# Patient Record
(demographics unavailable — no encounter records)

---

## 2021-05-10 NOTE — EMERGENCY DEPARTMENT REPORT
ED General Adult HPI





- General


Chief complaint: Pain General


Stated complaint: LUPUS FLAIR/SOB


Time Seen by Provider: 05/10/21 21:06


Source: patient


Mode of arrival: Ambulatory


Limitations: No Limitations





- History of Present Illness


Initial comments: 





Patient is 50 years old female with history of lupus.  Patient sent to the 

emergency room by her primary care physician Dr. Mccray.  Patient presented with

what she called a typical lupus flareup.  Patient is complaining of chest pain, 

back pain, lower extremity pain for the last 3 to 4 days.  Patient denied any 

fever or chills.  No cough or shortness of breath.  Patient also denied any 

nausea or vomiting.





- Related Data


                                Home Medications











 Medication  Instructions  Recorded  Confirmed  Last Taken


 


Gabapentin 600 mg PO BID 06/07/15 03/04/16 06/06/15


 


ALPRAZolam [Xanax TAB] 2 mg PO PRN PRN 03/04/16 03/04/16 Unknown


 


Sertraline [Zoloft] 50 mg PO DAILY 03/04/16 03/04/16 Unknown








                                  Previous Rx's











 Medication  Instructions  Recorded  Last Taken  Type


 


Hydrocodone Bit/Acetaminophen 1 each PO Q6H PRN #60 tablet 09/19/13 06/06/15 Rx





[Lortab  mg]    


 


Calcium Carbonate/Vitamin D3 1 each PO DAILY #30 tablet 03/19/14 06/06/15 Rx





[Calcium 600-Vit D3 400 Tablet]    


 


Tizanidine HCl [Zanaflex] 4 mg PO TID PRN #90 tablet 03/19/14 06/06/15 Rx


 


Zolpidem (Nf) [Ambien (Nf)] 10 mg PO QHS PRN #30 tablet 03/19/14 06/06/15 Rx


 


dilTIAZem [Cardizem] 60 mg PO QDAY #30 tablet 03/19/14 06/06/15 Rx


 


Aspirin [Aspirin BABY CHEW TAB] 81 mg PO QDAY #30 tab 06/10/15 Unknown Rx


 


AtorvaSTATin [Lipitor] 20 mg PO QHS #30 tablet 06/10/15 Unknown Rx


 


HYDROcodone/APAP  [[place] 1 each PO Q6H PRN #30 tablet 06/10/15 Unknown Rx





 mg TAB]    


 


Promethazine [Phenergan] 25 mg PO Q6H PRN #30 tablet 06/10/15 Unknown Rx


 


predniSONE [Deltasone] 20 mg PO QDAY #5 tab 03/04/16 Unknown Rx


 


HYDROcodone/APAP 5-325 [[place] 1 each PO Q6HR PRN #18 tablet 10/04/17 Unknown Rx





5/325]    


 


Naproxen [Naprosyn TAB] 375 mg PO BID PRN #20 tablet 10/04/17 Unknown Rx


 


Ondansetron [Zofran Odt] 4 mg PO Q8H PRN #15 tab.rapdis 10/04/17 Unknown Rx


 


Tamsulosin [Flomax] 0.4 mg PO QDAY #10 cap 10/04/17 Unknown Rx











                                    Allergies











Allergy/AdvReac Type Severity Reaction Status Date / Time


 


povidone-iodine Allergy  Hives Verified 09/16/13 13:33





[From Betadine]     


 


soap [From Betadine] Allergy  Hives Verified 09/16/13 13:33


 


tomato [Tomato] Allergy  Angioedema Verified 09/16/13 13:37


 


latex AdvReac  Hives Verified 06/07/15 00:02














ED Review of Systems


ROS: 


Stated complaint: LUPUS FLAIR/SOB


Other details as noted in HPI





Comment: All other systems reviewed and negative


Constitutional: denies: chills, fever


Respiratory: denies: cough, shortness of breath, SOB with exertion, SOB at rest


Cardiovascular: chest pain, palpitations


Gastrointestinal: denies: abdominal pain, nausea, vomiting, diarrhea


Musculoskeletal: back pain, arthralgia, myalgia


Neurological: denies: headache





ED Past Medical Hx





- Past Medical History


Previous Medical History?: Yes


Hx Hypertension: Yes


Hx CVA: Yes (R.Side weakness)


Hx Heart Attack/AMI: Yes


Hx Congestive Heart Failure: No


Hx Diabetes: No


Hx Deep Vein Thrombosis: Yes


Hx Pulmonary Embolism: Yes


Hx GERD: No


Hx Liver Disease: No


Hx Renal Disease: Yes (Lupus Related)


Hx Sickle Cell Disease: No


Hx Arthritis: Yes


Hx Headaches / Migraines: No


Hx Seizures: No


Hx Kidney Stones: Yes


Hx Asthma: No


Hx COPD: No


Hx Tuberculosis: No


Hx Dementia: No


Hx HIV: No


Additional medical history: Lupus, Right Hemiparesis





- Surgical History


Past Surgical History?: Yes


Hx Coronary Stent: No


Hx Pacemaker: No


Hx Internal Defibrillator: No


Hx Cholecystectomy: Yes


Hx Appendectomy: Yes


Additional Surgical History: Tonisillectomy, Endometriosis.





- Social History


Smoking Status: Never Smoker


Substance Use Type: None





- Medications


Home Medications: 


                                Home Medications











 Medication  Instructions  Recorded  Confirmed  Last Taken  Type


 


Hydrocodone Bit/Acetaminophen 1 each PO Q6H PRN #60 tablet 09/19/13 03/04/16 

06/06/15 Rx





[Lortab  mg]     


 


Calcium Carbonate/Vitamin D3 1 each PO DAILY #30 tablet 03/19/14 03/04/16 

06/06/15 Rx





[Calcium 600-Vit D3 400 Tablet]     


 


Tizanidine HCl [Zanaflex] 4 mg PO TID PRN #90 tablet 03/19/14 03/04/16 06/06/15 

Rx


 


Zolpidem (Nf) [Ambien (Nf)] 10 mg PO QHS PRN #30 tablet 03/19/14 03/04/16 

06/06/15 Rx


 


dilTIAZem [Cardizem] 60 mg PO QDAY #30 tablet 03/19/14 03/04/16 06/06/15 Rx


 


Gabapentin 600 mg PO BID 06/07/15 03/04/16 06/06/15 History


 


Aspirin [Aspirin BABY CHEW TAB] 81 mg PO QDAY #30 tab 06/10/15 03/04/16 Unknown 

Rx


 


AtorvaSTATin [Lipitor] 20 mg PO QHS #30 tablet 06/10/15 03/04/16 Unknown Rx


 


HYDROcodone/APAP  [[place] 1 each PO Q6H PRN #30 tablet 06/10/15 03/04/16 

Unknown Rx





 mg TAB]     


 


Promethazine [Phenergan] 25 mg PO Q6H PRN #30 tablet 06/10/15 03/04/16 Unknown 

Rx


 


ALPRAZolam [Xanax TAB] 2 mg PO PRN PRN 03/04/16 03/04/16 Unknown History


 


Sertraline [Zoloft] 50 mg PO DAILY 03/04/16 03/04/16 Unknown History


 


predniSONE [Deltasone] 20 mg PO QDAY #5 tab 03/04/16  Unknown Rx


 


HYDROcodone/APAP 5-325 [[place] 1 each PO Q6HR PRN #18 tablet 10/04/17  Unknown Rx





5/325]     


 


Naproxen [Naprosyn TAB] 375 mg PO BID PRN #20 tablet 10/04/17  Unknown Rx


 


Ondansetron [Zofran Odt] 4 mg PO Q8H PRN #15 tab.rapdis 10/04/17  Unknown Rx


 


Tamsulosin [Flomax] 0.4 mg PO QDAY #10 cap 10/04/17  Unknown Rx














ED Physical Exam





- General


Limitations: No Limitations


General appearance: alert, in no apparent distress





- Head


Head exam: Present: atraumatic, normocephalic, normal inspection





- Eye


Eye exam: Present: normal appearance, PERRL





- ENT


ENT exam: Present: mucous membranes dry





- Neck


Neck exam: Present: normal inspection, full ROM.  Absent: tenderness, 

meningismus





- Respiratory


Respiratory exam: Present: normal lung sounds bilaterally





- Cardiovascular


Cardiovascular Exam: Present: tachycardia





- GI/Abdominal


GI/Abdominal exam: Present: soft, normal bowel sounds.  Absent: distended, 

tenderness, guarding, rebound, rigid, organomegaly, mass, bruit, pulsatile mass,

 hernia





- Extremities Exam


Extremities exam: Present: normal inspection, full ROM, normal capillary refill





- Back Exam


Back exam: Present: normal inspection, full ROM.  Absent: CVA tenderness (R), 

CVA tenderness (L)





- Neurological Exam


Neurological exam: Present: alert, oriented X3, CN II-XII intact





- Psychiatric


Psychiatric exam: Present: normal mood





- Skin


Skin exam: Present: warm, dry, intact





ED Course


                                   Vital Signs











  05/10/21





  15:54


 


Temperature 98.3 F


 


Pulse Rate 113 H


 


Respiratory 18





Rate 


 


Blood Pressure 171/91





[Right] 


 


O2 Sat by Pulse 100





Oximetry 














ED Medical Decision Making





- Lab Data


Result diagrams: 


                                 05/10/21 16:51





                                 05/10/21 16:51





- Radiology Data


Radiology results: report reviewed





- Medical Decision Making





Patient is 50 years old female with history of lupus.  Patient sent to the 

emergency room by her primary care physician Dr. Mccray.  Patient presented with

 what she called a typical lupus flareup.  Patient is complaining of chest pain,

 back pain, lower extremity pain for the last 3 to 4 days.  Patient denied any 

fever or chills.  No cough or shortness of breath.  Patient also denied any 

nausea or vomiting.


Labs reviewed and is unremarkable.  Chest x-ray is negative for acute finding.  

Patient received normal saline, morphine and Zofran and Solu-Medrol.  Discussed 

patient with , he agreed to admit the patient to medical service for 

further management.


Critical care attestation.: 


If time is entered above; I have spent that time in minutes in the direct care 

of this critically ill patient, excluding procedure time.








ED Disposition


Clinical Impression: 


 Exacerbation of systemic lupus erythematosus, Acute pain in joint





Disposition: DC-09 OP ADMIT IP TO THIS HOSP


Is pt being admited?: Yes


Condition: Stable


Referrals: 


ALENA MCCRAY MD [Primary Care Provider] - 3-5 Days

## 2021-05-10 NOTE — HISTORY AND PHYSICAL REPORT
History of Present Illness


Date of examination: 05/10/21


Date of admission: 


05/10/21


Chief complaint: 





Joint pain


Skin rash


Generalized body ache


History of present illness: 





Patient is 50 years old female with history of lupus.  Patient sent to the 

emergency room by her primary care physician Dr. Bazan.  Patient presented with

what she called a typical lupus flareup.  Patient is complaining of chest pain, 

back pain, lower extremity pain for the last 3 to 4 days.  Patient denied any 

fever or chills.  No cough or shortness of breath.  Patient also denied any naus

ea or vomiting.





ED work-up shows WBC 7.8 hemoglobin 14.4, platelets 284, sodium 141, potassium 

4.1, creatinine 0.6


Magnesium 1.80 serum glucose 96, troponin normal.  Checks x-ray no acute 

finding.


Patient seen in ED at bedside.  Alert oriented x3.  Patient reported generalized

pain and joint pain.  Patient has history of lupus.She said she has similar 

some symptom whenever she has lupus flareup.  Patient has reddened rash left 

thigh.  She said is been there for few days and she tried over-the-counter 

topical cream without help.  I reviewed patient medical record, medication 

record, and vital signs.  Will start patient on steroid systemic, pain 

management and IV hydration.





Past History


Past Medical History: hypertension


Past Surgical History: No surgical history


Social history: lives with family


Family history: no significant family history





Medications and Allergies


                                    Allergies











Allergy/AdvReac Type Severity Reaction Status Date / Time


 


povidone-iodine Allergy  Hives Verified 09/16/13 13:33





[From Betadine]     


 


soap [From Betadine] Allergy  Hives Verified 09/16/13 13:33


 


tomato [Tomato] Allergy  Angioedema Verified 09/16/13 13:37


 


latex AdvReac  Hives Verified 06/07/15 00:02











                                Home Medications











 Medication  Instructions  Recorded  Confirmed  Last Taken  Type


 


Hydrocodone Bit/Acetaminophen 1 each PO Q6H PRN #60 tablet 09/19/13 03/04/16 

06/06/15 Rx





[Lortab  mg]     


 


Calcium Carbonate/Vitamin D3 1 each PO DAILY #30 tablet 03/19/14 03/04/16 3 

Months Ago Rx





[Calcium 600-Vit D3 400 Tablet]    ~02/11/21 


 


Tizanidine HCl [Zanaflex] 4 mg PO TID PRN #90 tablet 03/19/14 03/04/16 1 Day Ago

 Rx





    ~05/10/21 


 


Zolpidem (Nf) [Ambien (Nf)] 10 mg PO QHS PRN #30 tablet 03/19/14 03/04/16 1 Day 

Ago Rx





    ~05/10/21 


 


dilTIAZem [Cardizem] 60 mg PO QDAY #30 tablet 03/19/14 03/04/16 1 Day Ago Rx





    ~05/10/21 


 


Gabapentin 600 mg PO TID 06/07/15 03/04/16 1 Week Ago History





    ~05/04/21 


 


Aspirin [Aspirin BABY CHEW TAB] 81 mg PO QDAY #30 tab 06/10/15 03/04/16 Unknown 

Rx


 


HYDROcodone/APAP  [[place]co 1 each PO Q6H PRN #30 tablet 06/10/15 03/04/16 

Unknown Rx





 mg TAB]     


 


Promethazine [Phenergan] 25 mg PO Q6H PRN #30 tablet 06/10/15 03/04/16 1 Month 

Ago Rx





    ~04/11/21 


 


ALPRAZolam [Xanax TAB] 2 mg PO PRN PRN 03/04/16 03/04/16 1 Week Ago History





    ~05/04/21 


 


Sertraline [Zoloft] 50 mg PO DAILY 03/04/16 03/04/16 1 Day Ago History





    ~05/10/21 


 


HYDROcodone/APAP 5-325 [[place] 1 each PO Q6HR PRN #18 tablet 10/04/17  Unknown Rx





5/325]     


 


Naproxen [Naprosyn TAB] 375 mg PO BID PRN #20 tablet 10/04/17  Unknown Rx


 


Ondansetron [Zofran Odt] 4 mg PO Q8H PRN #15 tab.rapdis 10/04/17  2 Months Ago 

Rx





    ~03/11/21 


 


Tamsulosin [Flomax] 0.4 mg PO QDAY #10 cap 10/04/17  Unknown Rx


 


AtorvaSTATin [Lipitor] 40 mg PO QHS 05/11/21  1 Day Ago History





    ~05/10/21 


 


Clindamycin 2% [Clindamycin 2% VAG BID 05/11/21  1 Day Ago History





CREAM]    ~05/10/21 


 


Furosemide [Lasix TAB] 20 mg 05/11/21  1 Week Ago History





    ~05/04/21 


 


Magnesium Oxide 400 mg PO DAILY 05/11/21 05/11/21 1 Day Ago History





    ~05/10/21 


 


Potassium Chloride 10 meq PO 05/11/21  1 Day Ago History





    ~05/10/21 


 


amLODIPine 10 mg 05/11/21  1 Day Ago History





    ~05/10/21 


 


celeBREX 200 mg PO BID 05/11/21 05/11/21 1 Day Ago History





    ~05/10/21 


 


predniSONE [Deltasone] 10 mg PO QDAY 05/11/21  1 Day Ago History





    ~05/10/21 











Active Meds: 


Active Medications





Acetaminophen (Acetaminophen 325 Mg Tab)  650 mg PO Q4H PRN


   PRN Reason: Pain MILD(1-3)/Fever >100.5/HA


Al Hydrox/Mg Hydrox/Simethicone (Alum-Mag Hydroxide-Simethicone 700-962-45ph/5ml

Oral Liqd 30 Ml)  30 ml PO Q4H PRN


   PRN Reason: Indigestion


Famotidine (Famotidine 20 Mg/2 Ml Inj)  20 mg IV BID SRAVANTHI


Sodium Chloride (Nacl 0.9% 1000 Ml)  1,000 mls @ 100 mls/hr IV AS DIRECT SRAVANTHI


Magnesium Hydroxide (Magnesium Hydroxide (Mom) Oral Liqd Udc)  30 ml PO Q4H PRN


   PRN Reason: Constipation


Methylprednisolone Sodium Succinate (Methylprednisolone Sod Succinate 40 Mg/1 Ml

Inj)  40 mg IV Q8HR SRAVANTHI


Morphine Sulfate (Morphine 2 Mg/1 Ml Inj)  2 mg IV Q6H PRN


   PRN Reason: Pain, Moderate (4-6)


Ondansetron HCl (Ondansetron 4 Mg/2 Ml Inj)  4 mg IV Q8H PRN


   PRN Reason: Nausea And Vomiting


Oxycodone/Acetaminophen (Oxycodone /Acetaminophen 5-325mg Tab)  1 tab PO Q6H PRN


   PRN Reason: Pain, Moderate (4-6)


Senna (Sennosides 8.6 Mg Tab)  8.6 mg PO Q12HR PRN


   PRN Reason: Constipation


Sodium Chloride (Sodium Chloride 0.9% 10 Ml Flush Syringe)  10 ml IV BID SRAVANTHI


Sodium Chloride (Sodium Chloride 0.9% 10 Ml Flush Syringe)  10 ml IV PRN PRN


   PRN Reason: LINE FLUSH


Triamcinolone Acetonide (Triamcinolone 0.5% Cream 15 Gm)  1 applic TP BID [facility]











Review of Systems


Constitutional: weakness


Ears, nose, mouth and throat: no epistaxis, no bleeding gums


Breasts: discharge


Respiratory: no congestion


Gastrointestinal: no melena


Genitourinary Female: no pelvic pain


Rectal: no hemorrhoids


Musculoskeletal: other (Joint pain), no neck pain, no low back pain, no muscle 

weakness


Integumentary: rash, pruritis, redness


Neurological: no changes in smell/taste


Hematologic/Lymphatic: no easy bruising, no easy bleeding


Allergic/Immunologic: no urticaria





Exam





- Constitutional


Vitals: 


                                        











Temp Pulse Resp BP Pulse Ox


 


 98.2 F   94 H  18   137/69   98 


 


 05/10/21 23:30  05/10/21 23:15  05/10/21 23:27  05/10/21 23:15  05/10/21 23:15











General appearance: Present: mild distress, obese





- EENT


Eyes: Present: PERRL


ENT: hearing intact, clear oral mucosa





- Neck


Neck: Present: supple, normal ROM





- Respiratory


Respiratory effort: normal


Respiratory: bilateral: CTA





- Cardiovascular


Heart rate: 91


Heart Sounds: Present: S1 & S2.  Absent: rub, click





- Extremities


Extremities: pulses symmetrical, No edema


Peripheral Pulses: within normal limits





- Abdominal


General gastrointestinal: Present: soft, non-tender, non-distended, normal bowel

sounds


Female genitourinary: Present: normal





- Integumentary


Integumentary: Present: clear, warm, dry





- Musculoskeletal


Musculoskeletal: gait normal, strength equal bilaterally





- Psychiatric


Psychiatric: appropriate mood/affect, intact judgment & insight, cooperative





- Neurologic


Neurologic: CNII-XII intact, moves all extremities





- Allied Health


Allied health notes reviewed: nursing





HEART Score





- HEART Score


Troponin: 


                                        











Troponin T  < 0.010 ng/mL (0.00-0.029)   05/10/21  16:51    














Results





- Labs


CBC & Chem 7: 


                                 05/10/21 16:51





                                 05/10/21 16:51


Labs: 


                              Abnormal lab results











  05/10/21 Range/Units





  16:51 


 


Hgb  14.4 H  (10.1-14.3)  gm/dl


 


Hct  43.0 H  (30.3-42.9)  %


 


Mono % (Auto)  7.5 H  (0.0-7.3)  %














Assessment and Plan





- Patient Problems


(1) Exacerbation of systemic lupus


Current Visit: Yes   Status: Acute   


Plan to address problem: 


Patient with history of lupus with multiple flareups


Start IV hydration and systemic steroid


Pain management








(2) Hypertension


Current Visit: No   Status: Acute   


Plan to address problem: 


Monitor blood pressure 


resume home antihypertensive


As needed hydralazine








(3) Acute pain in joint


Current Visit: Yes   Status: Acute   


Plan to address problem: 


Most likely secondary to lupus flareup


Patient is on systemic steroid and IV hydration


Pain management as needed








(4) DVT prophylaxis


Current Visit: No   Status: Acute   


Plan to address problem: 


Subcutaneous Lovenox

## 2021-05-10 NOTE — EVENT NOTE
ED Screening Note


Date of service: 05/10/21


Time: 16:17


ED Screening Note: 


50-year-old female with a past medical history of lupus was sent to the ER by 

her PCP for a lupus flare and possible admission.  Patient reports generalized 

pain as well as shortness of breath, dysuria and urinary frequency, dizziness 

and headache.  Typically her primary care doctor directs admit her but he could 

not and so instead sent her in for IV fluids, steroids and admission.











This initial assessment/diagnostic orders/clinical plan/treatment(s) is/are 

subject to change based on patients health status, clinical progression and re-

assessment by fellow clinical providers in the ED. Further treatment and workup 

at subsequent clinical providers discretion. Patient/guardian urged not to elope

from the ED as their condition may be serious if not clinically assessed and 

managed. 





Initial orders include: 


Labs including EKG, chest x-ray and urinalysis

## 2021-05-10 NOTE — XRAY REPORT
CHEST 2 VIEWS 



INDICATION / CLINICAL INFORMATION: sob. Chest pain



COMPARISON: 03/04/16



FINDINGS:



SUPPORT DEVICES: None.

HEART / MEDIASTINUM: No significant abnormality. 

LUNGS / PLEURA: No significant pulmonary or pleural abnormality. No pneumothorax. 



ADDITIONAL FINDINGS: No significant additional findings.



IMPRESSION:

1. No acute findings.



Signer Name: MADELYN Montenegro MD 

Signed: 5/10/2021 4:45 PM

Workstation Name: [email or web address]

## 2021-05-11 NOTE — PROGRESS NOTE
Assessment and Plan


Assessment and plan: 





SLE exacerbation.





Hypertension.





Acute joint pain.





5/11/2021.  Continue IV steroids.  PT evaluation.  Continue home 

antihypertensive medications.





History


Interval history: 





No new issues overnight.





Hospitalist Physical





- Constitutional


Vitals: 


                                        











Temp Pulse Resp BP Pulse Ox


 


 98.1 F   102 H  94 H  116/49   2 L


 


 05/11/21 05:34  05/11/21 05:34  05/11/21 05:45  05/11/21 05:34  05/11/21 05:45











General appearance: Present: mild distress, obese





- EENT


Eyes: Present: PERRL, EOM intact


ENT: hearing intact, clear oral mucosa, dentition normal





- Neck


Neck: Present: supple, normal ROM





- Respiratory


Respiratory effort: normal


Respiratory: bilateral: CTA





- Cardiovascular


Rhythm: regular


Heart Sounds: Present: S1 & S2.  Absent: gallop, rub





- Extremities


Extremities: no ischemia, No edema, Full ROM





- Abdominal


General gastrointestinal: soft, non-tender, non-distended, normal bowel sounds





- Integumentary


Integumentary: Present: clear, warm, dry





- Neurologic


Neurologic: CNII-XII intact, moves all extremities





HEART Score





- HEART Score


Troponin: 


                                        











Troponin T  < 0.010 ng/mL (0.00-0.029)   05/10/21  16:51    














Results





- Labs


CBC & Chem 7: 


                                 05/11/21 07:41





                                 05/11/21 07:41


Labs: 


                             Laboratory Last Values











WBC  6.6 K/mm3 (4.5-11.0)   05/11/21  07:41    


 


RBC  5.15 M/mm3 (3.65-5.03)  H  05/11/21  07:41    


 


Hgb  14.7 gm/dl (10.1-14.3)  H  05/11/21  07:41    


 


Hct  44.2 % (30.3-42.9)  H  05/11/21  07:41    


 


MCV  86 fl (79-97)   05/11/21  07:41    


 


MCH  29 pg (28-32)   05/11/21  07:41    


 


MCHC  33 % (30-34)   05/11/21  07:41    


 


RDW  14.9 % (13.2-15.2)   05/11/21  07:41    


 


Plt Count  276 K/mm3 (140-440)   05/11/21  07:41    


 


Lymph % (Auto)  9.5 % (13.4-35.0)  L  05/11/21  07:41    


 


Mono % (Auto)  0.8 % (0.0-7.3)   05/11/21  07:41    


 


Eos % (Auto)  0.0 % (0.0-4.3)   05/11/21  07:41    


 


Baso % (Auto)  0.0 % (0.0-1.8)   05/11/21  07:41    


 


Lymph # (Auto)  0.6 K/mm3 (1.2-5.4)  L  05/11/21  07:41    


 


Mono # (Auto)  0.1 K/mm3 (0.0-0.8)   05/11/21  07:41    


 


Eos # (Auto)  0.0 K/mm3 (0.0-0.4)   05/11/21  07:41    


 


Baso # (Auto)  0.0 K/mm3 (0.0-0.1)   05/11/21  07:41    


 


Seg Neutrophils %  89.7 % (40.0-70.0)  H  05/11/21  07:41    


 


Seg Neutrophils #  5.9 K/mm3 (1.8-7.7)   05/11/21  07:41    


 


Sodium  139 mmol/L (137-145)   05/11/21  07:41    


 


Potassium  3.9 mmol/L (3.6-5.0)   05/11/21  07:41    


 


Chloride  103.6 mmol/L ()   05/11/21  07:41    


 


Carbon Dioxide  26 mmol/L (22-30)   05/11/21  07:41    


 


Anion Gap  13 mmol/L  05/11/21  07:41    


 


BUN  12 mg/dL (7-17)   05/11/21  07:41    


 


Creatinine  0.7 mg/dL (0.6-1.2)   05/11/21  07:41    


 


Estimated GFR  > 60 ml/min  05/11/21  07:41    


 


BUN/Creatinine Ratio  17 %  05/11/21  07:41    


 


Glucose  169 mg/dL ()  H  05/11/21  07:41    


 


Calcium  8.7 mg/dL (8.4-10.2)   05/11/21  07:41    


 


Magnesium  1.80 mg/dL (1.7-2.3)   05/10/21  16:51    


 


Total Bilirubin  0.40 mg/dL (0.1-1.2)   05/11/21  07:41    


 


AST  70 units/L (5-40)  H  05/11/21  07:41    


 


ALT  64 units/L (7-56)  H  05/11/21  07:41    


 


Alkaline Phosphatase  104 units/L ()   05/11/21  07:41    


 


Troponin T  < 0.010 ng/mL (0.00-0.029)   05/10/21  16:51    


 


Total Protein  7.4 g/dL (6.3-8.2)   05/11/21  07:41    


 


Albumin  4.0 g/dL (3.9-5)   05/11/21  07:41    


 


Albumin/Globulin Ratio  1.2 %  05/11/21  07:41    


 


Urine Color  Yellow  (Yellow)   05/10/21  16:05    


 


Urine Turbidity  Clear  (Clear)   05/10/21  16:05    


 


Urine pH  6.0  (5.0-7.0)   05/10/21  16:05    


 


Ur Specific Gravity  1.016  (1.003-1.030)   05/10/21  16:05    


 


Urine Protein  <15 mg/dl mg/dL (Negative)   05/10/21  16:05    


 


Urine Glucose (UA)  Neg mg/dL (Negative)   05/10/21  16:05    


 


Urine Ketones  Neg mg/dL (Negative)   05/10/21  16:05    


 


Urine Blood  Neg  (Negative)   05/10/21  16:05    


 


Urine Nitrite  Neg  (Negative)   05/10/21  16:05    


 


Urine Bilirubin  Neg  (Negative)   05/10/21  16:05    


 


Urine Urobilinogen  < 2.0 mg/dL (<2.0)   05/10/21  16:05    


 


Ur Leukocyte Esterase  Neg  (Negative)   05/10/21  16:05    


 


Urine WBC (Auto)  < 1.0 /HPF (0.0-6.0)   05/10/21  16:05    


 


Urine RBC (Auto)  1.0 /HPF (0.0-6.0)   05/10/21  16:05    


 


U Epithel Cells (Auto)  1.0 /HPF (0-13.0)   05/10/21  16:05    


 


Urine Mucus  Few /HPF  05/10/21  16:05    











Spicer/IV: 


                                        





Voiding Method                   Toilet











Active Medications





- Current Medications


Current Medications: 














Generic Name Dose Route Start Last Admin





  Trade Name Freq  PRN Reason Stop Dose Admin


 


Acetaminophen  650 mg  05/10/21 23:30 





  Acetaminophen 325 Mg Tab  PO  





  Q4H PRN  





  Pain MILD(1-3)/Fever >100.5/HA  


 


Al Hydrox/Mg Hydrox/Simethicone  30 ml  05/10/21 23:30 





  Alum-Mag Hydroxide-Simethicone 349-537-21xt/5ml Oral Liqd 30 Ml  PO  





  Q4H PRN  





  Indigestion  


 


Diltiazem HCl  60 mg  05/11/21 10:00 





  Diltiazem 60 Mg Tab  PO  





  DAILY SRAVANTHI  


 


Enoxaparin Sodium  40 mg  05/11/21 10:00 





  Enoxaparin 40 Mg/0.4 Ml Inj  SUB-Q  





  DAILY [facility]  





  Protocol  


 


Famotidine  20 mg  05/11/21 10:00 





  Famotidine 20 Mg/2 Ml Inj  IV  





  BID SRAVANTHI  


 


Hydralazine HCl  5 mg  05/11/21 04:15 





  Hydralazine 20 Mg/1 Ml Inj  IV  





  Q4HR PRN  





  Hypertension  


 


Sodium Chloride  1,000 mls @ 100 mls/hr  05/10/21 23:30  05/11/21 01:17





  Nacl 0.9% 1000 Ml  IV   100 mls/hr





  AS DIRECT SRAVANTHI   Administration


 


Magnesium Hydroxide  30 ml  05/10/21 23:30 





  Magnesium Hydroxide (Mom) Oral Liqd Udc  PO  





  Q4H PRN  





  Constipation  


 


Methylprednisolone Sodium Succinate  40 mg  05/11/21 06:00  05/11/21 05:28





  Methylprednisolone Sod Succinate 40 Mg/1 Ml Inj  IV   40 mg





  Q8HR SRAVANTHI   Administration


 


Morphine Sulfate  2 mg  05/10/21 23:32  05/11/21 01:17





  Morphine 2 Mg/1 Ml Inj  IV   2 mg





  Q6H PRN   Administration





  Pain, Moderate (4-6)  


 


Ondansetron HCl  4 mg  05/10/21 23:30 





  Ondansetron 4 Mg/2 Ml Inj  IV  





  Q8H PRN  





  Nausea And Vomiting  


 


Oxycodone/Acetaminophen  1 tab  05/10/21 23:32 





  Oxycodone /Acetaminophen 5-325mg Tab  PO  





  Q6H PRN  





  Pain, Moderate (4-6)  


 


Senna  8.6 mg  05/10/21 23:30 





  Sennosides 8.6 Mg Tab  PO  





  Q12HR PRN  





  Constipation  


 


Sodium Chloride  10 ml  05/11/21 10:00 





  Sodium Chloride 0.9% 10 Ml Flush Syringe  IV  





  BID SRAVANTHI  


 


Sodium Chloride  10 ml  05/10/21 23:30 





  Sodium Chloride 0.9% 10 Ml Flush Syringe  IV  





  PRN PRN  





  LINE FLUSH  


 


Trazodone HCl  50 mg  05/11/21 00:59  05/11/21 02:36





  Trazodone 50 Mg Tab  PO   50 mg





  QHS PRN   Administration





  Insomnia  


 


Triamcinolone Acetonide  1 applic  05/10/21 23:45 





  Triamcinolone 0.5% Cream 15 Gm  TP  





  BID SRAVANTHI

## 2021-05-12 NOTE — PROGRESS NOTE
Assessment and Plan


Assessment and plan: 





SLE exacerbation.





Hypertension.





Acute joint pain.





Headache





5/11/2021.  Continue IV steroids.  PT evaluation.  Continue home 

antihypertensive medications.





5/12/2021.  Continue IV steroids for now.  Await PT evaluation.  Patient 

complains of severe headache that started this morning.  Check CT scan of the 

head.





History


Interval history: 





No new issues overnight.





Hospitalist Physical





- Constitutional


Vitals: 


                                        











Temp Pulse Resp BP Pulse Ox


 


 98.8 F   90   18   129/62   95 


 


 05/12/21 05:32  05/12/21 05:32  05/12/21 05:32  05/12/21 05:32  05/12/21 09:33











General appearance: Present: mild distress, obese





- EENT


Eyes: Present: PERRL, EOM intact


ENT: hearing intact, clear oral mucosa, dentition normal





- Neck


Neck: Present: supple, normal ROM





- Respiratory


Respiratory effort: normal


Respiratory: bilateral: CTA





- Cardiovascular


Rhythm: regular


Heart Sounds: Present: S1 & S2.  Absent: gallop, rub





- Extremities


Extremities: no ischemia, No edema, Full ROM





- Abdominal


General gastrointestinal: soft, non-tender, non-distended, normal bowel sounds





- Integumentary


Integumentary: Present: clear, warm, dry





- Neurologic


Neurologic: CNII-XII intact, moves all extremities





HEART Score





- HEART Score


Troponin: 


                                        











Troponin T  < 0.010 ng/mL (0.00-0.029)   05/10/21  16:51    














Results





- Labs


CBC & Chem 7: 


                                 05/11/21 07:41





                                 05/11/21 07:41


Labs: 


                             Laboratory Last Values











WBC  6.6 K/mm3 (4.5-11.0)   05/11/21  07:41    


 


RBC  5.15 M/mm3 (3.65-5.03)  H  05/11/21  07:41    


 


Hgb  14.7 gm/dl (10.1-14.3)  H  05/11/21  07:41    


 


Hct  44.2 % (30.3-42.9)  H  05/11/21  07:41    


 


MCV  86 fl (79-97)   05/11/21  07:41    


 


MCH  29 pg (28-32)   05/11/21  07:41    


 


MCHC  33 % (30-34)   05/11/21  07:41    


 


RDW  14.9 % (13.2-15.2)   05/11/21  07:41    


 


Plt Count  276 K/mm3 (140-440)   05/11/21  07:41    


 


Lymph % (Auto)  9.5 % (13.4-35.0)  L  05/11/21  07:41    


 


Mono % (Auto)  0.8 % (0.0-7.3)   05/11/21  07:41    


 


Eos % (Auto)  0.0 % (0.0-4.3)   05/11/21  07:41    


 


Baso % (Auto)  0.0 % (0.0-1.8)   05/11/21  07:41    


 


Lymph # (Auto)  0.6 K/mm3 (1.2-5.4)  L  05/11/21  07:41    


 


Mono # (Auto)  0.1 K/mm3 (0.0-0.8)   05/11/21  07:41    


 


Eos # (Auto)  0.0 K/mm3 (0.0-0.4)   05/11/21  07:41    


 


Baso # (Auto)  0.0 K/mm3 (0.0-0.1)   05/11/21  07:41    


 


Seg Neutrophils %  89.7 % (40.0-70.0)  H  05/11/21  07:41    


 


Seg Neutrophils #  5.9 K/mm3 (1.8-7.7)   05/11/21  07:41    


 


Sodium  139 mmol/L (137-145)   05/11/21  07:41    


 


Potassium  3.9 mmol/L (3.6-5.0)   05/11/21  07:41    


 


Chloride  103.6 mmol/L ()   05/11/21  07:41    


 


Carbon Dioxide  26 mmol/L (22-30)   05/11/21  07:41    


 


Anion Gap  13 mmol/L  05/11/21  07:41    


 


BUN  12 mg/dL (7-17)   05/11/21  07:41    


 


Creatinine  0.7 mg/dL (0.6-1.2)   05/11/21  07:41    


 


Estimated GFR  > 60 ml/min  05/11/21  07:41    


 


BUN/Creatinine Ratio  17 %  05/11/21  07:41    


 


Glucose  169 mg/dL ()  H  05/11/21  07:41    


 


Calcium  8.7 mg/dL (8.4-10.2)   05/11/21  07:41    


 


Magnesium  1.80 mg/dL (1.7-2.3)   05/10/21  16:51    


 


Total Bilirubin  0.40 mg/dL (0.1-1.2)   05/11/21  07:41    


 


AST  70 units/L (5-40)  H  05/11/21  07:41    


 


ALT  64 units/L (7-56)  H  05/11/21  07:41    


 


Alkaline Phosphatase  104 units/L ()   05/11/21  07:41    


 


Troponin T  < 0.010 ng/mL (0.00-0.029)   05/10/21  16:51    


 


Total Protein  7.4 g/dL (6.3-8.2)   05/11/21  07:41    


 


Albumin  4.0 g/dL (3.9-5)   05/11/21  07:41    


 


Albumin/Globulin Ratio  1.2 %  05/11/21  07:41    


 


Urine Color  Yellow  (Yellow)   05/10/21  16:05    


 


Urine Turbidity  Clear  (Clear)   05/10/21  16:05    


 


Urine pH  6.0  (5.0-7.0)   05/10/21  16:05    


 


Ur Specific Gravity  1.016  (1.003-1.030)   05/10/21  16:05    


 


Urine Protein  <15 mg/dl mg/dL (Negative)   05/10/21  16:05    


 


Urine Glucose (UA)  Neg mg/dL (Negative)   05/10/21  16:05    


 


Urine Ketones  Neg mg/dL (Negative)   05/10/21  16:05    


 


Urine Blood  Neg  (Negative)   05/10/21  16:05    


 


Urine Nitrite  Neg  (Negative)   05/10/21  16:05    


 


Urine Bilirubin  Neg  (Negative)   05/10/21  16:05    


 


Urine Urobilinogen  < 2.0 mg/dL (<2.0)   05/10/21  16:05    


 


Ur Leukocyte Esterase  Neg  (Negative)   05/10/21  16:05    


 


Urine WBC (Auto)  < 1.0 /HPF (0.0-6.0)   05/10/21  16:05    


 


Urine RBC (Auto)  1.0 /HPF (0.0-6.0)   05/10/21  16:05    


 


U Epithel Cells (Auto)  1.0 /HPF (0-13.0)   05/10/21  16:05    


 


Urine Mucus  Few /HPF  05/10/21  16:05    











Microbiology: 


Microbiology





05/10/21 16:18   Urine,Clean Catch   Urine Culture - Preliminary








Spicer/IV: 


                                        





Voiding Method                   Toilet











Active Medications





- Current Medications


Current Medications: 














Generic Name Dose Route Start Last Admin





  Trade Name Freq  PRN Reason Stop Dose Admin


 


Acetaminophen  650 mg  05/10/21 23:30 





  Acetaminophen 325 Mg Tab  PO  





  Q4H PRN  





  Pain MILD(1-3)/Fever >100.5/HA  


 


Al Hydrox/Mg Hydrox/Simethicone  30 ml  05/10/21 23:30 





  Alum-Mag Hydroxide-Simethicone 627-745-47ez/5ml Oral Liqd 30 Ml  PO  





  Q4H PRN  





  Indigestion  


 


Diltiazem HCl  60 mg  05/11/21 10:00  05/12/21 09:20





  Diltiazem 60 Mg Tab  PO   60 mg





  DAILY SRAVANTHI   Administration


 


Enoxaparin Sodium  40 mg  05/11/21 10:00  05/12/21 09:20





  Enoxaparin 40 Mg/0.4 Ml Inj  SUB-Q   40 mg





  DAILY SRAVANTHI   Administration





  Protocol  


 


Famotidine  20 mg  05/11/21 10:00  05/12/21 09:21





  Famotidine 20 Mg/2 Ml Inj  IV   20 mg





  BID SRAVANTHI   Administration


 


Hydralazine HCl  5 mg  05/11/21 04:15 





  Hydralazine 20 Mg/1 Ml Inj  IV  





  Q4HR PRN  





  SBP >/=160; DBP >/=100  


 


Sodium Chloride  1,000 mls @ 100 mls/hr  05/10/21 23:30  05/12/21 05:18





  Nacl 0.9% 1000 Ml  IV   100 mls/hr





  AS DIRECT SRAVANTHI   Administration


 


Magnesium Hydroxide  30 ml  05/10/21 23:30 





  Magnesium Hydroxide (Mom) Oral Liqd Udc  PO  





  Q4H PRN  





  Constipation  


 


Methylprednisolone Sodium Succinate  40 mg  05/11/21 06:00  05/12/21 05:18





  Methylprednisolone Sod Succinate 40 Mg/1 Ml Inj  IV   40 mg





  Q8HR SRAVANTHI   Administration


 


Morphine Sulfate  2 mg  05/10/21 23:32  05/11/21 16:21





  Morphine 2 Mg/1 Ml Inj  IV   2 mg





  Q6H PRN   Administration





  Pain, Moderate (4-6)  


 


Ondansetron HCl  4 mg  05/10/21 23:30  05/12/21 09:24





  Ondansetron 4 Mg/2 Ml Inj  IV   4 mg





  Q8H PRN   Administration





  Nausea And Vomiting  


 


Oxycodone/Acetaminophen  1 tab  05/10/21 23:32  05/12/21 09:19





  Oxycodone /Acetaminophen 5-325mg Tab  PO   1 tab





  Q6H PRN   Administration





  Pain, Moderate (4-6)  


 


Senna  8.6 mg  05/10/21 23:30 





  Sennosides 8.6 Mg Tab  PO  





  Q12HR PRN  





  Constipation  


 


Sodium Chloride  10 ml  05/11/21 10:00  05/12/21 09:21





  Sodium Chloride 0.9% 10 Ml Flush Syringe  IV   10 ml





  BID SRAVANTHI   Administration


 


Sodium Chloride  10 ml  05/10/21 23:30 





  Sodium Chloride 0.9% 10 Ml Flush Syringe  IV  





  PRN PRN  





  LINE FLUSH  


 


Triamcinolone Acetonide  1 applic  05/10/21 23:45  05/12/21 09:20





  Triamcinolone 0.5% Cream 15 Gm  TP   1 applic





  BID SRAVANTHI   Administration


 


Zolpidem Tartrate  10 mg  05/11/21 20:20 





  Zolpidem 5 Mg Tab  PO  





  QHS PRN  





  Insomnia  














Nutrition/Malnutrition Assess





- Dietary Evaluation


Nutrition/Malnutrition Findings: 


                                        





Nutrition Notes                                            Start:  05/11/21 

10:34


Freq:                                                      Status: Active       




Protocol:                                                                       




 Document     05/11/21 10:34  AT  (Rec: 05/11/21 10:36  AT  [id number])


 Co-Sign      05/11/21 10:34  LP


 Nutrition Notes


     Need for Assessment generated from:         RN Referral,MST


     Initial or Follow up                        Assessment


     Current Diagnosis                           Hypertension


     Other Pertinent Diagnosis                   Systemic Lupus Erythematosus,


                                                 LE pain


     Current Diet                                Mechanical Soft


     Labs/Tests                                  


     Pertinent Medications                       Solu-Medrol


                                                 NS at 100 mL/hr


                                                 Zofran


     Height                                      5 ft 10 in


     Weight                                      131.8 kg


     Usual Body Weight                           143.18 kg


     Ideal Body Weight (kg)                      68.18


     BMI                                         41.6


     Intake Prior to Admission                   Fair


     Weight change and time frame                Per pt, 7.9% weight loss in 2


                                                 months


     Weight Status                               Morbidly Obese


     Subjective/Other Information                Consult for malnutrition


                                                 screening. Visited pt at


                                                 bedside and observed no


                                                 physical signs of malnutrition


                                                 . Pt reports 25 lb


                                                 unintentional weight loss in 2


                                                 months. Pt c/o chronic nausea


                                                 as a result of lupus. Pt


                                                 reports consuming less than or


                                                 equal to 50% of energy needs


                                                 for months PTA. Pt reports


                                                 consuming 75% of breakfast


                                                 this morning and reports a


                                                 normal appetite. Pt does not


                                                 have swallowing or chewing


                                                 difficulties nor the desire to


                                                 be on a mechanical soft diet


                                                 at this time. Pt's nutritional


                                                 history puts her at risk for


                                                 malnutrition. However, writer


                                                 does not anticipate poor


                                                 intake based on pt's reported


                                                 current appetite. Will follow


                                                 for stable intakes.


     Percent of energy/protein needs met:        83%/53%


     Burn                                        Absent


     Trauma                                      Absent


     GI Symptoms                                 Nausea


     Food Allergy                                Yes


     Current % PO                                Good (%)


     Minimum of two criteria                     Yes


     Energy Intake (severe)                      < or equal to 50% Estimated


                                                 Energy Requirement > or equal


                                                 to 5 days


     Interpretation of Weight Loss (severe)      >5% in 1 month


     Muscle Mass                                 Mild Depletion (non-severe)


     #1


      Nutrition Diagnosis                        Malnutrition


      Etiology                                   chronic illness


      As Evidenced by Signs and Symptoms         <50% EER >5 days, 7.9% weight


                                                 loss in 2 months, edema


     Is patient on ventilator?                   No


     Is Patient Ambulatory and/or Out of Bed     Yes


     REE-([place] Je[place]-ambulatory/OOB) [     2623.725


      [id number]]                               


     Kcal/Kg value to use for calculation        14


     Approximate Energy Requirements Using       1845


      kcal/Kg                                    


     Calculation Used for Recommendations        Kcal/kg


     Additional Notes                            PRO needs: 120-150g (1.2-1.5g/


                                                 kg AdBW 100kg)


                                                 Fluid needs: 1mL/kcal or per


                                                 MD


 Nutrition Intervention


     Change Diet Order:                          Change to Cardiac diet, per pt


                                                 [facility] Soft diet not


                                                 appropriate


     Goal #1                                     Meet at least 75% of EER and


                                                 protein needs via diet


     Anticipated Discharge Needs:                Cardiac diet


     Follow-Up By:                               05/14/21


     Additional Comments                         F/U for stable intakes, ONS


                                                 needs

## 2021-05-12 NOTE — CAT SCAN REPORT
CT head/brain wo con



INDICATION / CLINICAL INFORMATION:

50 years Female; Headache. 



TECHNIQUE: Routine CT head without contrast. All CT scans at this location are performed using CT dos
e reduction for ALARA by means of automated exposure control. 



COMPARISON: 

None.



FINDINGS:



BRAIN / INTRACRANIAL CONTENTS: The brain appears to demonstrate appropriate attenuation for age. The 
ventricular system is within normal limits in size and configuration. There is no CT evidence of acut
e intracranial hemorrhage or significant mass effect. 



ORBITS: No significant abnormality of visualized orbits.

SINUSES / MASTOIDS: No significant abnormality in the visualized paranasal sinuses or mastoid air alton
ls.



CRANIOCERVICAL JUNCTION: No significant abnormality.

ADDITIONAL FINDINGS: None. 



IMPRESSION:

1. The CT of the brain is unremarkable for age. 



Signer Name: Eric Tyson MD 

Signed: 5/12/2021 12:43 PM

Workstation Name: [id number]

## 2021-05-13 NOTE — ELECTROCARDIOGRAPH REPORT
[facility]

                                       

Test Date:    2021-05-10               Test Time:    16:25:52

Pat Name:     EMILEE PARKS     Department:   

Patient ID:   [id number]          Room:         A3 1

Gender:       F                        Technician:   

:          1970               Requested By: CIRO AIKEN

Order Number: [id number]              Reading MD:   Rosie Sexton

                                 Measurements

Intervals                              Axis          

Rate:         108                      P:            51

AK:           181                      QRS:          14

QRSD:         76                       T:            58

QT:           316                                    

QTc:          424                                    

                           Interpretive Statements

Sinus tachycardia

No previous ECG available for comparison

Electronically Signed On 2021 11:38:14 EDT by Rosie Sexton

## 2021-05-13 NOTE — DISCHARGE SUMMARY
Providers





- Providers


Date of Admission: 


05/10/21 22:49





Date of discharge: 05/13/21


Attending physician: 


BRETT GUILLEN





                                        





05/11/21 09:42


Physical Therapy Evaluation and Treat [CONS] Routine 


   Comment: 


   Reason For Exam: SLE exacerbation











Primary care physician: 


ALENA BAZAN








Hospitalization


Reason for admission: SLE exacerbation


Condition: Stable


Hospital course: 





Patient is 50 years old female with history of lupus.  Patient sent to the 

emergency room by her primary care physician Dr. Bazan.  Patient presented with

 what she called a typical lupus flareup. 


ED work-up showed WBC 7.8 hemoglobin 14.4, platelets 284, sodium 141, potassium 

4.1, creatinine 0.6


Magnesium 1.80 serum glucose 96, troponin normal.  Checks x-ray no acute 

finding.


Patient was admitted with diagnosis of SLE exacerbation.  The patient was 

treated with IV steroids and was seen by physical therapy.  Patient has slow but

 significant improvement throughout hospitalization.  Patient did complain of a 

headache in which a CT scan of the head was obtained that was found to be 

negative.  Physical therapy recommended home health PT.  Patient has returned 

back to her baseline functioning status and thus will be discharged home.  

Dedicated discharge time 35 minutes.


 


Disposition: DC-01 TO HOME OR SELFCARE


Final Discharge Diagnosis (Prints w/discharge instructions): SLE exacerbation.





Core Measure Documentation





- Palliative Care


Palliative Care/ Comfort Measures: Not Applicable





- Core Measures


Any of the following diagnoses?: none





Exam





- Constitutional


Vitals: 


                                        











Temp Pulse Resp BP Pulse Ox


 


 98.6 F   80   18   139/79   94 


 


 05/13/21 05:43  05/13/21 05:43  05/13/21 05:43  05/13/21 05:43  05/13/21 05:43











General appearance: Present: no acute distress, well-nourished





- EENT


Eyes: Present: PERRL


ENT: hearing intact, clear oral mucosa





- Neck


Neck: Present: supple, normal ROM





- Respiratory


Respiratory effort: normal


Respiratory: bilateral: CTA





- Cardiovascular


Heart Sounds: Present: S1 & S2.  Absent: rub, click





- Extremities


Extremities: pulses symmetrical, No edema


Peripheral Pulses: within normal limits





- Abdominal


General gastrointestinal: Present: soft, non-tender, non-distended, normal bowel

 sounds


Female genitourinary: Present: normal





- Integumentary


Integumentary: Present: clear, warm, dry





- Musculoskeletal


Musculoskeletal: gait normal, strength equal bilaterally





- Psychiatric


Psychiatric: appropriate mood/affect, intact judgment & insight





- Neurologic


Neurologic: CNII-XII intact, moves all extremities





Plan


Activity: advance as tolerated


Weight Bearing Status: Weight Bear as Tolerated


Diet: regular


Follow up with: 


ALENA BAZAN MD [Primary Care Provider] - 3-5 Days


Prescriptions: 


amLODIPine 10 mg PO DAILY #30


Aspirin [Aspirin BABY CHEW TAB] 81 mg PO QDAY #30 tab


dilTIAZem [Cardizem] 60 mg PO QDAY #30 tablet


Celecoxib [celeBREX] 200 mg PO BID #60 cap


predniSONE [Deltasone] 10 mg PO QDAY #5


AtorvaSTATin [Lipitor] 40 mg PO QHS #30


HYDROcodone/APAP 5-325 [Norco 5-325 mg TAB] 1 each PO Q6HR PRN #18 tablet


 PRN Reason: Pain


Pantoprazole [Protonix TAB] 40 mg PO QDAY #30


ALPRAZolam [Xanax TAB] 2 mg PO PRN PRN #15


 PRN Reason: Anxiety


Sertraline [Zoloft] 50 mg PO DAILY #30